# Patient Record
Sex: MALE | Race: WHITE | NOT HISPANIC OR LATINO | Employment: FULL TIME | URBAN - METROPOLITAN AREA
[De-identification: names, ages, dates, MRNs, and addresses within clinical notes are randomized per-mention and may not be internally consistent; named-entity substitution may affect disease eponyms.]

---

## 2024-04-06 ENCOUNTER — HOSPITAL ENCOUNTER (EMERGENCY)
Facility: HOSPITAL | Age: 49
Discharge: HOME | End: 2024-04-06
Attending: EMERGENCY MEDICINE

## 2024-04-06 VITALS
TEMPERATURE: 98 F | BODY MASS INDEX: 24.62 KG/M2 | DIASTOLIC BLOOD PRESSURE: 79 MMHG | SYSTOLIC BLOOD PRESSURE: 168 MMHG | OXYGEN SATURATION: 100 % | HEART RATE: 69 BPM | RESPIRATION RATE: 19 BRPM | HEIGHT: 70 IN | WEIGHT: 172 LBS

## 2024-04-06 DIAGNOSIS — K08.89 DENTALGIA: ICD-10-CM

## 2024-04-06 DIAGNOSIS — K02.9 DENTAL CARIES: Primary | ICD-10-CM

## 2024-04-06 PROCEDURE — 99284 EMERGENCY DEPT VISIT MOD MDM: CPT

## 2024-04-06 RX ORDER — CLINDAMYCIN HYDROCHLORIDE 150 MG/1
300 CAPSULE ORAL 4 TIMES DAILY
Qty: 56 CAPSULE | Refills: 0 | Status: SHIPPED | OUTPATIENT
Start: 2024-04-06 | End: 2024-04-13

## 2024-04-06 RX ORDER — TRAMADOL HYDROCHLORIDE 50 MG/1
50 TABLET ORAL EVERY 6 HOURS PRN
Qty: 10 TABLET | Refills: 0 | Status: SHIPPED | OUTPATIENT
Start: 2024-04-06

## 2024-04-06 NOTE — ED NOTES
Assumed care    Patient presents to ED with complaints of upper jaw/ tooth pain that started 2 weeks ago. Patient states he has difficulty chewing. Denies fever at home. No swelling to right jaw, no discoloration, no drainage. Patient has no difficulty communicating. AAOx4

## 2024-04-06 NOTE — ED PROVIDER NOTES
Encounter Date: 4/6/2024       History     Chief Complaint   Patient presents with    Dental Pain     48-year-old male presents for pain to his wisdom tooth.  He reports he has had pain to the right upper back wisdom tooth for a few weeks that is getting progressively worse.  He denies any associated fever/chills, facial swelling, nausea/vomiting, trauma, chest pain, or trouble breathing.  His symptoms are unrelieved with over-the-counter ibuprofen.  He does report some gingival swelling.  There are no alleviating or aggravating factors.      Review of patient's allergies indicates:   Allergen Reactions    Pcn [penicillins]      No past medical history on file.  No past surgical history on file.  No family history on file.     Review of Systems   Constitutional:  Negative for chills, diaphoresis, fatigue and fever.   HENT:  Positive for dental problem. Negative for congestion and rhinorrhea.    Respiratory:  Negative for cough and shortness of breath.    Cardiovascular:  Negative for chest pain.   Gastrointestinal:  Negative for abdominal pain, diarrhea, nausea and vomiting.   Genitourinary:  Negative for dysuria, frequency and testicular pain.   Musculoskeletal:  Negative for gait problem.   Skin:  Negative for color change.   Neurological:  Negative for dizziness and numbness.   Psychiatric/Behavioral:  Negative for agitation and confusion.        Physical Exam     Initial Vitals [04/06/24 1544]   BP Pulse Resp Temp SpO2   (!) 165/80 66 18 98.5 °F (36.9 °C) 100 %      MAP       --         Physical Exam    Nursing note and vitals reviewed.  Constitutional: He appears well-developed and well-nourished.   HENT:   Head: Normocephalic and atraumatic.   Multiple dental caries noted, including the right upper 2nd molar and wisdom tooth.  Mild gingival edema noted.  No fluctuance or abscess noted.   Eyes: EOM are normal. Pupils are equal, round, and reactive to light.   Neck: Neck supple.   Cardiovascular:  Normal rate  and regular rhythm.           Pulmonary/Chest: Breath sounds normal.   Abdominal: Abdomen is soft. Bowel sounds are normal.   Musculoskeletal:         General: Normal range of motion.      Cervical back: Neck supple.     Neurological: He is alert and oriented to person, place, and time.   Skin: Skin is warm and dry.   Psychiatric: He has a normal mood and affect.         ED Course   Procedures  Labs Reviewed - No data to display       Imaging Results    None          Medications - No data to display  Medical Decision Making  48-year-old male presented with a toothache.    Initial differential diagnosis included but not limited to tooth abscess, dentalgia, and impacted wisdom tooth.    Risk  Prescription drug management.  Risk Details: The patient was emergently evaluated in the emergency department, his evaluation was significant for a middle-age male with multiple dental caries noted.  The patient has no signs of an acute tooth abscess at this time.  The patient is stable for discharge to home and does not require further care or workup this time.  He will be discharged home with p.o. clindamycin and p.o. Ultram.  He is to follow-up with Oral maxillofacial surgery for further care.                                      Clinical Impression:  Final diagnoses:  [K02.9] Dental caries (Primary)  [K08.89] Dentalgia          ED Disposition Condition    Discharge Stable          ED Prescriptions       Medication Sig Dispense Start Date End Date Auth. Provider    clindamycin (CLEOCIN) 150 MG capsule Take 2 capsules (300 mg total) by mouth 4 (four) times daily. for 7 days 56 capsule 4/6/2024 4/13/2024 Rudy Layton MD    traMADoL (ULTRAM) 50 mg tablet Take 1 tablet (50 mg total) by mouth every 6 (six) hours as needed for Pain. 10 tablet 4/6/2024 -- Rudy Lyaton MD          Follow-up Information       Follow up With Specialties Details Why Contact Info    Vicente Pappas DDS Oral and Maxillofacial Surgery  Schedule an appointment as soon as possible for a visit   [id number] [facility]  [address] [place] [id number]  [phone]               Rudy Layton MD  04/06/24 7050